# Patient Record
Sex: FEMALE | Race: WHITE | Employment: FULL TIME | URBAN - NONMETROPOLITAN AREA
[De-identification: names, ages, dates, MRNs, and addresses within clinical notes are randomized per-mention and may not be internally consistent; named-entity substitution may affect disease eponyms.]

---

## 2017-08-07 ENCOUNTER — OFFICE VISIT (OUTPATIENT)
Dept: ENT CLINIC | Age: 22
End: 2017-08-07
Payer: COMMERCIAL

## 2017-08-07 VITALS
SYSTOLIC BLOOD PRESSURE: 108 MMHG | HEIGHT: 67 IN | RESPIRATION RATE: 16 BRPM | HEART RATE: 78 BPM | DIASTOLIC BLOOD PRESSURE: 70 MMHG | TEMPERATURE: 98.3 F | WEIGHT: 159.5 LBS | BODY MASS INDEX: 25.03 KG/M2

## 2017-08-07 DIAGNOSIS — R49.0 HOARSENESS OF VOICE: Primary | ICD-10-CM

## 2017-08-07 PROCEDURE — 99202 OFFICE O/P NEW SF 15 MIN: CPT | Performed by: NURSE PRACTITIONER

## 2017-08-07 RX ORDER — OMEPRAZOLE 20 MG/1
20 CAPSULE, DELAYED RELEASE ORAL DAILY
Qty: 30 CAPSULE | Refills: 1 | Status: SHIPPED | OUTPATIENT
Start: 2017-08-07 | End: 2017-09-22 | Stop reason: SDUPTHER

## 2017-08-07 ASSESSMENT — ENCOUNTER SYMPTOMS
COLOR CHANGE: 0
TROUBLE SWALLOWING: 0
ABDOMINAL PAIN: 0
NAUSEA: 0
BLOOD IN STOOL: 0
ABDOMINAL DISTENTION: 0
RHINORRHEA: 0
APNEA: 0
SHORTNESS OF BREATH: 0
SINUS PRESSURE: 0
SORE THROAT: 0
EYE REDNESS: 0
CHOKING: 0
FACIAL SWELLING: 0
PHOTOPHOBIA: 0
CONSTIPATION: 0
EYE PAIN: 0
BACK PAIN: 0
EYE ITCHING: 0
ANAL BLEEDING: 0
DIARRHEA: 0
RECTAL PAIN: 0
STRIDOR: 0
VOMITING: 0
VOICE CHANGE: 1
EYE DISCHARGE: 0
CHEST TIGHTNESS: 0
COUGH: 0
WHEEZING: 0

## 2017-09-22 ENCOUNTER — OFFICE VISIT (OUTPATIENT)
Dept: ENT CLINIC | Age: 22
End: 2017-09-22
Payer: COMMERCIAL

## 2017-09-22 VITALS
WEIGHT: 150 LBS | BODY MASS INDEX: 24.11 KG/M2 | HEART RATE: 60 BPM | TEMPERATURE: 98.3 F | RESPIRATION RATE: 14 BRPM | DIASTOLIC BLOOD PRESSURE: 68 MMHG | SYSTOLIC BLOOD PRESSURE: 112 MMHG | HEIGHT: 66 IN

## 2017-09-22 DIAGNOSIS — R55 VASOVAGAL SYNCOPE: ICD-10-CM

## 2017-09-22 DIAGNOSIS — R49.0 HOARSENESS OF VOICE: Primary | ICD-10-CM

## 2017-09-22 DIAGNOSIS — K21.9 GASTROESOPHAGEAL REFLUX DISEASE WITHOUT ESOPHAGITIS: ICD-10-CM

## 2017-09-22 DIAGNOSIS — J38.2 VOCAL CORD NODULE: ICD-10-CM

## 2017-09-22 PROCEDURE — 31575 DIAGNOSTIC LARYNGOSCOPY: CPT | Performed by: OTOLARYNGOLOGY

## 2017-09-22 PROCEDURE — 99213 OFFICE O/P EST LOW 20 MIN: CPT | Performed by: OTOLARYNGOLOGY

## 2017-09-22 RX ORDER — GLYCOPYRROLATE 1 MG/1
1 TABLET ORAL DAILY PRN
Qty: 10 TABLET | Refills: 1 | Status: SHIPPED | OUTPATIENT
Start: 2017-09-22 | End: 2018-04-11 | Stop reason: ALTCHOICE

## 2017-09-22 RX ORDER — OMEPRAZOLE 20 MG/1
20 CAPSULE, DELAYED RELEASE ORAL DAILY
Qty: 30 CAPSULE | Refills: 1 | Status: SHIPPED | OUTPATIENT
Start: 2017-09-22 | End: 2017-12-14 | Stop reason: SDUPTHER

## 2017-09-22 ASSESSMENT — ENCOUNTER SYMPTOMS
SINUS PRESSURE: 0
TROUBLE SWALLOWING: 0
FACIAL SWELLING: 0
STRIDOR: 0
VOICE CHANGE: 0
APNEA: 0
NAUSEA: 0
VOMITING: 0
COLOR CHANGE: 0
ABDOMINAL PAIN: 0
SORE THROAT: 0
RHINORRHEA: 0
CHEST TIGHTNESS: 0
COUGH: 0
DIARRHEA: 0
CHOKING: 0
WHEEZING: 0
SHORTNESS OF BREATH: 0

## 2017-10-11 ENCOUNTER — HOSPITAL ENCOUNTER (OUTPATIENT)
Dept: SPEECH THERAPY | Age: 22
Setting detail: THERAPIES SERIES
Discharge: HOME | End: 2017-10-11
Payer: COMMERCIAL

## 2017-10-11 PROCEDURE — 92524 BEHAVRAL QUALIT ANALYS VOICE: CPT

## 2017-10-11 PROCEDURE — 92507 TX SP LANG VOICE COMM INDIV: CPT

## 2017-10-11 NOTE — PROGRESS NOTES
I certify that I have examined the patient below and determined that Physical Medicine and Rehabilitation service is necessary; that the secondary diagnosis for the provision of rehabilitation services is consistent with identified needs; that service will be furnished on an outpatient basis while the patient is in my care; that I approve the above plan of care for up to 90 days or as specifically noted above and will review it within that time frame or more often if the patients condition requires. Attestation, signature or co-signature of physician indicates approval of certification requirements.    ________________________ ____________ __________  Physician Signature   Date   Time    [address] EVALUATION      []Outpatient [address]  [][facility]  [][facility]  [] Rehab [] TCU    Date: 10/11/2017  Patient Name: Belem De La Cruz      CSN: [id number]   : 1995  (25 y.o.)  Gender: female   Referring Physician:  Dr. Tenzin Gonsales   Diagnosis: Hoarseness of voice   Secondary Diagnosis:  Dysphonia   Insurance/Certification Information: [employer]   Visit number / total approved visits:  1 - unlimited visits   Visit count since last progress note:  1  Certification Date:     History of Present Illness: Pt reports she 'loses her voice' on a weekly basis and has a more 'raspy' vocal quality, with onset occurring ~4-5 years ago, if not more. 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 Pt was referred to ENT by her PCP and saw Magalie Metcalf CNP on 17 and was prescribed medication for reflux. Pt returned for a follow-up appointment with Dr. Eloina Peterson on 17 and completed laryngoscopy with indications of small vocal nodule on right true vocal cord at junction of anterior and middle third.   ENT recommended patient continue with acid reflux and initiate voice therapy. Pt has a follow-up with Dr. Zac Mccullough on 11/21/17 during her Thanksgiving break from college (pt is a student in [place]). Please see medical history questionnaire for information related to prior medical history, allergies and medications    Subjective:  Pt pleasant and cooperative. No observation.     Pain: 0/10  Previous Tx: []Yes [x]No []Comment:  Medications: [x]Yes []No []List: Prilosec  Allergies: []Yes [x]No []List:  Asthma: []Yes [x]No []Comment:   GERD:  [x]Yes []No []Comment:     ORAL MOTOR EXAMINATION: [x]WFL []Impaired  []Comments:    BREATHING:   At Rest:  []Clavicular []Diaphragmatic/Abdominal [x]Thoracic  During Speech:  []Clavicular []Diaphragmatic/Abdominal [x]Thoracic    BREATH SUPPORT FOR SPEECH:  (Assessed during serial tasks - counting, reciting alphabet, days of the week, etc.)  []Adequate [x]Marginal []Inadequate  []Comments:    MAXIMUM PHONATION TIME:  5.9 Seconds with sustained /i/ production; 6.2 seconds with sustained /a/ production     VELOPHARYNGEAL CLOSURE/ADEQUACY:  [x]Adequate []Marginal []Inadequate  []Comments    VOICE PRODUCTION DURING CONNECTED SPEECH AND VOCAL TASKS:    NASAL RESONANCE: [x]Normal  []Nasal []Denasal      []Assimilation nasality  []Nasal emission      []Comments:    PITCH: []Normal  []Too high []Too low [x]Variable    []Diplophonia       Fundamental Frequency of Phonation:  Unable to be assessed due to inaccessibility to Visipitch system     Musical Fundamental Frequency range: Decreased perceptual pitch range with aphonia during attempts to produce higher frequencies           VOCAL INFLECTION: [x]Normal []Monotone []Excessive      []Comments:    ORAL RESONANCE:  [x]Normal []Cul-de-sac []Chesty       []Variable      []Comments:    INTENSITY / LOUDNESS: [x]Normal     VOCAL QUALITY:    Breathy []Mild [x]Moderate []Severe   Dysphonic []Mild []Moderate []Severe   Harsh []Mild []Moderate []Severe   Hoarse [x]Mild []Moderate []Severe   Strained/Strangled []Mild []Moderate []Severe   Strident []Mild []Moderate []Severe   Other []Mild  []Moderate []Severe     LARYNGEAL TENSION:   []Normal []Hypotension  [x]Hypertension: 4/10 in severity   []Comments:    SUSTAINED VOICED - VOICELESS PRODUCTION:  S/Z Ratio: 2.3 seconds, indicative of vocal fold pathology       Trial 1 Trial 2 Trial 3   /s/ 13.2 sec 18.4 sec 17.3 sec   /z/ 7.9 sec 7.9 sec 6.5 sec     RATE OF SPEECH: [x]Normal  []Abnormal     []Comments:    IDENTIFIED VOCAL ABUSES / MISUSES:  [x]Yelling - occasional   []Singing (abusive) [x]Coughing [x]Throat clearing   []Persistent URI factor  []Grunting  []Smoking [x]Caffeine: 1 cup of coffee a day   []Vocally demanding job []Coaching  []Talking over equipment    []Reduced hydration  []Smoky environments [x]Alcohol: 15 beers in a week  [x]Prolonged talking: socially  **Drinks 6-7 bottles of water a day    DYSARTHRIA: []Yes  [x]No     []Comments:    STIMULABILITY FOR IMPROVED VOICE: [x]Yes     CLINICAL IMPRESSIONS: The patient presents with mild-moderate dysphonia characterized by vocal quality that is moderately breathy and mildly hoarse with intermittently brief periods of complete aphonia. In addition, the patient presents with decreased pitch range, laryngeal hypertension, and perceptual pitch that is deemed too low at times in conversation. Pt feels she is at 60% of vocal baseline. Pt reports her dysphonia does affect her communication as she avoids social interaction when she 'loses her voice.' Pt reports her dysphonia worsens after talking for longer periods of time. The patient would benefit from speech therapy services to address dysphonia for improved vocal quality and thus improved communication in social settings. However, pt is unable to attend regular speech therapy services due to being a college student in [place].   Therefore, treatment/education was initiated this date with recommendations to continue with HEP independently. Recommend patient return for a 6 week recheck when she is home for Thanksgiving break. If her symptoms have not improved by that time, the patient may require a referral to a facility closer to [place] that she could receive more regular speech therapy. Pt is in agreement with this plan. RECOMMENDATIONS:   [x]Voice Therapy with 6 week recheck as patient is a student in [place] and is unable to attend therapy regularly. Specific Interventions for next treatment may include: monitor carryover use of vocal hygiene principles and GERD modifications; resonant voicing at the paragraph level; diaphragmatic breathing at the sentence level; relaxation exercises/stretches     EDUCATION:   Learner: [x]Patient [] Significant other [] Son/Daughter[] Parent     [] Other:   Education: [x] Reviewed results and recommendations of this evaluation     [] Reviewed diet and strategies     [] Reviewed signs, symptoms and risk of aspiration     [] Demonstrated how to thick liquid appropriately. [x] Reviewed goals and Plan of Care     [x] OTHER: Home exercise program    Method: [x] Discussion [x] Demonstration [x] Hand-out     [] OTHER:   Evaluation of Education:     [x] Verbalizes understanding [] Demonstrates with assistance     [] Demonstrates without assistance []Needs further instruction     [] No evidence of learning  [x] Family not present      SHORT TERM GOALS:   Short-term Goals (same as long term goals due to recheck only)   Timeframe for Short-term Goals: 6 weeks   Goal 1: The patient will demonstrate carryover use and/or independently identify vocal hygiene principles and GERD lifestyle modifications with no more than 2 throat clears within a 30 minute session for improved laryngeal health and thus improved vocal quality.     Goal 2: The patient will demonstrate appropriate use of resonant voicing 80% of the time when reading 5-7 sentence novel paragraph when provided min cues to decrease laryngeal hyperfunction and improve vocal quality. Goal 3: The patient will report laryngeal tension with severity no greater than 2/10 following completion of upper body relaxation stretches/exercises to decrease laryngeal hyperfunction and improve laryngeal health. Goal 4: The patient will demonstrate appropriate use of diaphragmatic breathing when reading 10+ word sentences in 8/10 trials when provided min cues for improved breath support within running speech. Goal 5: The patient will read the [place] with clear vocal quality 60% of the time when provided min cues to facilitate carryover of improved vocal quality into conversation. LONG TERM GOALS:   Long-term Goals  Timeframe for Long-term Goals: 6 weeks   Goal 1: The patient will demonstrate carryover use and/or independently identify vocal hygiene principles and GERD lifestyle modifications with no more than 2 throat clears within a 30 minute session for improved laryngeal health and thus improved vocal quality. Goal 2: The patient will demonstrate appropriate use of resonant voicing 80% of the time when reading 5-7 sentence novel paragraph when provided min cues to decrease laryngeal hyperfunction and improve vocal quality. Goal 3: The patient will report laryngeal tension with severity no greater than 2/10 following completion of upper body relaxation stretches/exercises to decrease laryngeal hyperfunction and improve laryngeal health. Goal 4: The patient will demonstrate appropriate use of diaphragmatic breathing when reading 10+ word sentences in 8/10 trials when provided min cues for improved breath support within running speech. Goal 5: The patient will read the [place] with clear vocal quality 60% of the time when provided min cues to facilitate carryover of improved vocal quality into conversation.        Treatment initiated this session (25 minutes):  Skilled education and demonstration provided on vocal hygiene principles and

## 2017-11-21 ENCOUNTER — OFFICE VISIT (OUTPATIENT)
Dept: ENT CLINIC | Age: 22
End: 2017-11-21
Payer: COMMERCIAL

## 2017-11-21 VITALS
BODY MASS INDEX: 25 KG/M2 | SYSTOLIC BLOOD PRESSURE: 118 MMHG | WEIGHT: 154.9 LBS | RESPIRATION RATE: 16 BRPM | DIASTOLIC BLOOD PRESSURE: 74 MMHG | HEART RATE: 56 BPM | TEMPERATURE: 98.2 F

## 2017-11-21 DIAGNOSIS — J38.2 VOCAL CORD NODULE: ICD-10-CM

## 2017-11-21 DIAGNOSIS — R49.0 HOARSENESS OF VOICE: Primary | ICD-10-CM

## 2017-11-21 DIAGNOSIS — K21.9 GASTROESOPHAGEAL REFLUX DISEASE WITHOUT ESOPHAGITIS: ICD-10-CM

## 2017-11-21 PROCEDURE — 99213 OFFICE O/P EST LOW 20 MIN: CPT | Performed by: OTOLARYNGOLOGY

## 2017-11-21 PROCEDURE — 31575 DIAGNOSTIC LARYNGOSCOPY: CPT | Performed by: OTOLARYNGOLOGY

## 2017-11-21 ASSESSMENT — ENCOUNTER SYMPTOMS
NAUSEA: 0
ABDOMINAL PAIN: 0
VOMITING: 0
COUGH: 0
COLOR CHANGE: 0
VOICE CHANGE: 0
CHOKING: 0
STRIDOR: 0
DIARRHEA: 0
WHEEZING: 0
SHORTNESS OF BREATH: 0
SORE THROAT: 0
RHINORRHEA: 0
TROUBLE SWALLOWING: 0
APNEA: 0
SINUS PRESSURE: 0
FACIAL SWELLING: 0
CHEST TIGHTNESS: 0

## 2017-11-21 NOTE — PROGRESS NOTES
change, appetite change, chills, diaphoresis, fatigue, fever and unexpected weight change. HENT: Negative for congestion, dental problem, ear discharge, ear pain, facial swelling, hearing loss, mouth sores, nosebleeds, postnasal drip, rhinorrhea, sinus pressure, sneezing, sore throat, tinnitus, trouble swallowing and voice change. Eyes: Negative for visual disturbance. Respiratory: Negative for apnea, cough, choking, chest tightness, shortness of breath, wheezing and stridor. Cardiovascular: Negative for chest pain, palpitations and leg swelling. Gastrointestinal: Negative for abdominal pain, diarrhea, nausea and vomiting. Endocrine: Negative for cold intolerance, heat intolerance, polydipsia and polyuria. Genitourinary: Negative for dysuria, enuresis and hematuria. Musculoskeletal: Negative for arthralgias, gait problem, neck pain and neck stiffness. Skin: Negative for color change and rash. Allergic/Immunologic: Negative for environmental allergies, food allergies and immunocompromised state. Neurological: Negative for dizziness, syncope, facial asymmetry, speech difficulty, light-headedness and headaches. Hematological: Negative for adenopathy. Does not bruise/bleed easily. Psychiatric/Behavioral: Negative for confusion and sleep disturbance. The patient is not nervous/anxious. Objective:     /74 (Site: Right Arm, Position: Sitting)   Pulse 56   Temp 98.2 °F (36.8 °C) (Oral)   Resp 16   Wt 154 lb 14.4 oz (70.3 kg)   BMI 25.00 kg/m²     Physical Exam   Constitutional: She is oriented to person, place, and time. She appears well-developed and well-nourished. She is cooperative. HENT:   Head: Normocephalic and atraumatic. Head is without laceration. Right Ear: Hearing, tympanic membrane, external ear and ear canal normal. No drainage or swelling. Tympanic membrane is not scarred, not perforated and not erythematous.  Tympanic membrane mobility is normal (on pneumatic of the arytenoid area bilaterally. . No pooling in the pyriform sinuses. She fainted during scope procedure, just lasting a few seconds. She has gotten lightheaded before and 1 or 2 episodes of fainting. Data:  All of the past medical history, past surgical history, family history, social history, allergies and current medications were reviewed with the patient. Assessment & Plan   Diagnoses and all orders for this visit:    1. Hoarseness of voice  AK LARYNGOSCOPY FLEXIBLE DIAGNOSTIC   2. Vocal cord nodule  AK LARYNGOSCOPY FLEXIBLE DIAGNOSTIC   3. Gastroesophageal reflux disease without esophagitis  AK LARYNGOSCOPY FLEXIBLE DIAGNOSTIC       The findings were explained and her questions were answered. Options were discussed including seeing a speech pathologist.  She currently sees one and I suggested continue speech therapy. Voice rest and continue with the GERD Regimen. Don't clear her throat. I will see her back in a few months. Return in about 3 months (around 2/21/2018) for Follow-Up TVC nodules. I, Kole Partida CMA ([facility]), am scribing for, and in the presence of Dr. Ale Goff. Electronically signed by Ronen Bl[facility]) on 11/21/17 at 5:09 PM.     (Please note that portions of this note were completed with a voice recognition program. Efforts were made to edit the dictations but occasionally words are mis-transcribed.)    I agree to the above documentation placed by my scribe. I have personally evaluated this patient. Additional findings are as noted. I reviewed the scribe's note and agree with the documented findings and plan of care. Any areas of disagreement are corrected. I agree with the chief complaint, past medical history, past surgical history, allergies, medications, social and family history as documented unless otherwise noted below.      Electronically signed by Minh Austin MD on 12/17/2017 at 5:18 PM

## 2017-11-22 ENCOUNTER — HOSPITAL ENCOUNTER (OUTPATIENT)
Dept: SPEECH THERAPY | Age: 22
Setting detail: THERAPIES SERIES
Discharge: HOME | End: 2017-11-22
Payer: COMMERCIAL

## 2017-11-22 PROCEDURE — 92507 TX SP LANG VOICE COMM INDIV: CPT

## 2017-11-22 NOTE — PROGRESS NOTES
returns to [place] for school, recommend patient pursue speech therapy services in [place] to allow for consistent attendance where she lives. SHORT TERM GOAL #1: The patient will demonstrate carryover use and/or independently identify vocal hygiene principles and GERD lifestyle modifications with no more than 2 throat clears within a 30 minute session for improved laryngeal health and thus improved vocal quality. GOAL NOT MET. CONTINUE GOAL. INTERVENTIONS: Patient reports she has not been diligent with hydration principles. Patient reports she drinks ~3 cups of coffee a week with continuation of consistent alcohol intake (~15 beers a week). Discussed alcohol having a dehydrating effect and the need for more water intake to compensate for the alcohol if she chooses not to decrease the amount of alcohol she drinks weekly. Discussed strategies to promote water intake and foods that can also maximize hydration. Pt reports she notices she 'loses her voice' on Sundays after a weekend of going out with friends. Pt admits she uses a louder vocal intensity when attending sporting events and going to loud environments. Discussed the importance of using conservative voice and implementing vocal rests throughout her week/weekend. Patient demonstrated understanding. Pt reports she has tried to be more mindful of throat clearing, but feels she does it without realizing it. Patient did not demonstrate any instances of throat clearing this session; however, pt reports feeling chronic globus sensation in pharynx. Recommended patient ask a friend or family member to assist with identifying instances of throat clearing to promote improved self-awareness and thus 'break the habit' of throat clearing. Pt demonstrated understanding.         SHORT TERM GOAL #2:The patient will demonstrate appropriate use of resonant voicing 80% of the time when reading 5-7 sentence novel paragraph when provided min cues to decrease laryngeal hyperfunction and improve vocal quality. GOAL NOT MET. CONTINUE GOAL. INTERVENTIONS:  Reviewed the rationale for resonant voicing and how to elicit. Patient was able to establish appropriate resonant pitch during sustained hum with good success when provided mod cues. Good carryover into the structured phrase level. Patient was then instructed to read 10+ word sentence with use of resonant voicing following a model. Pt completed with good success. ST then asked patient to read the same sentence with 'glottal vela' to improve patient's self-awareness of the difference between glottal vela and resonant voice. Asked patient to pay attention to the difference in how glottal vela felt and sounded versus resonant voice. Pt confirmed noticing a difference and admitted she thinks she talks in glottal vela most of the time. Provided patient with handouts listing 10+ word sentences and asked patient to read 10 of them a day with focus on resonant voice. Patient demonstrated understanding. SHORT TERM GOAL #3:The patient will report laryngeal tension with severity no greater than 2/10 following completion of upper body relaxation stretches/exercises to decrease laryngeal hyperfunction and improve laryngeal health. GOAL MET. NEW GOAL: Patient will complete vocal function exercises 3x each with no more than 2 total pitch breaks and with good pitch range when provided mod cues for improved vocal fold flexibility. INTERVENTIONS: Patient denies feeling any laryngeal tension this session. Pt reports she has not felt significant laryngeal tension recently. No instances of strained upper body noted this date. Education provided on the rationale for vocal function exercises and how to complete each. Patient able to demonstrate each vocal function exercise with good return following a model. Recommended patient complete 3-5 repetitions of each exercise, 2x a day.   Pt demonstrated understanding - provided mod cues for improved vocal fold flexibility. Goal 4: The patient will demonstrate appropriate use of diaphragmatic breathing when reading 10+ word sentences in 8/10 trials when provided min cues for improved breath support within running speech. GOAL NOT MET. CONTINUE GOAL. Goal 5: The patient will read the [place] with clear vocal quality 60% of the time when provided min cues to facilitate carryover of improved vocal quality into conversation. GOAL NOT MET. CONTINUE GOAL. Assessment: [x]Progressing towards goals []Not Progressing towards goals     The patient has met 1/5 goals within the last 6 weeks. This session served as a 6 week recheck following initial evaluation as patient was unable to attend consistent therapy for the past 1.5 months due to living in [place] for college. Patient does not feel her voice has improved since the time of evaluation and admits poor carryover use of vocal hygiene principles, resonant voicing, and diaphragmatic breathing that was discussed at first session. The patient continues to present with moderately breathy vocal quality with intermittent hoarseness and occasional pitch breaks at the conversational level. In addition, patient reports experiencing aphonia 1-2x a week, particularly after using her voice for longer periods of time or when talking louder in social situations. The patient does report laryngeal tension has improved but continues to feel chronic globus sensation in pharynx. ST and patient agree that patient would benefit from more consistent voice therapy to address dysphonia. Will plan to see patient 1-2x a week for the next 8 weeks depending on her availability with college schedule. If patient's voice does not improve by the time she returns to living in [place] full time, patient would benefit from a referral to [place] facility to address dysphonia.       Plan for Next Session: carryover use of vocal hygiene principles, monitor throat clearing, resonant voicing at the sentence level, diaphragmatic breathing at the sentence level, vocal function exercises     Patient Tolerance of Treatment:  [x]Tolerated well []Tolerated fair []Required rest breaks  []Fatigued    Education:  [x]Education was provided []Education not provided due to:  Learner:  [x]Patient []Significant Other [x]Other: mother   Education provided regarding:  [x]Goals and POC [x]Vocal hygiene principles    [x]Home Exercise Program []Progress and/or discharge information  Method of Education: [x]Discussion [x]Demonstration [x]Handout []Other:  Evaluation of Education:  [x]Verbalized understanding []Demonstrates without assistance  []Demonstrates with assistance [x]Needs further instruction    []No evidence of learning  []No family present      Plan: []Continue with current plan of care []Modify current plan of care   [x]Progress note - frequency and duration: 1-2x a week for 8 weeks  (patient's ability to attend therapy weekly will depend on college schedule)   []Discharge notes - reason for discharge:     [x]Patient continues to require treatment by a licensed therapist to address functional deficits as outlined in the established plan of care. Certification required: [x] Yes - See Physician Certification above.       [] No       Time in: 1100  Time out: 1145  Untimed treatment: 45  Timed treatment: 0  Total time: 45 minutes          Sixto Schmitt [address], [address]

## 2017-12-01 ENCOUNTER — HOSPITAL ENCOUNTER (OUTPATIENT)
Dept: SPEECH THERAPY | Age: 22
Setting detail: THERAPIES SERIES
Discharge: HOME | End: 2017-12-01
Payer: COMMERCIAL

## 2017-12-01 PROCEDURE — 92507 TX SP LANG VOICE COMM INDIV: CPT | Performed by: SPEECH-LANGUAGE PATHOLOGIST

## 2017-12-01 NOTE — PROGRESS NOTES
[address] THERAPY    [x] DAILY NOTE [] PROGRESS NOTE [] DISCHARGE NOTE    [x]Outpatient [address]   [][place] [address] [facility]   [][facility]      Date: 2017  Patient Name: Jai Castillo      CSN: [id number]   : 1995  (25 y.o.)  Gender: female   Referring Physician:  Dr. Adam Anderson   Diagnosis: Hoarseness of voice   Secondary Diagnosis:  Dysphonia   Insurance/Certification Information: Medical [place]   Visit number / total approved visits: 3 - unlimited visits   Visit count since last progress note:  3  Certification Date:   Last scheduled appointment: 17  Date of Last MBS:  N/A  Diet:  Regular diet with thin liquids     Subjective: Patient reports she has been completing her HEP 1-2 times per day. Patient reports she has had a cold this week. SHORT TERM GOAL #1: The patient will demonstrate carryover use and/or independently identify vocal hygiene principles and GERD lifestyle modifications with no more than 2 throat clears within a 30 minute session for improved laryngeal health and thus improved vocal quality. INTERVENTIONS: Patient reports she is drinking at least 64 ounces of water per day. Discussed trying to increase water intake to half her body weight in ounces if able. Patient continues to drink coffee and alcohol on the weekends. Reviewed the importance of increasing her water intake on days that she drinks caffeine and alcohol. Patient report she has had a cold this week so she has been clearing her throat and coughing some, but trying to limit it as much as possible. No spontaneous coughing or throat clearing noted during this session. Ongoing education on the effects of alcohol and caffeine on voice and patient verbalized understanding. Patient educated on easy onset and rationale for it. This was also demonstrated for the patient.    Patient demonstrated the \"safe\" way to cough with good success, but reports it is difficult to do it when she suddenly feels the urge to cough. SHORT TERM GOAL #2:The patient will demonstrate appropriate use of resonant voicing 80% of the time when reading 5-7 sentence novel paragraph when provided min cues to decrease laryngeal hyperfunction and improve vocal quality. INTERVENTIONS:  Brief review of resonant voicing prior to reading some sentences. Patient read the 10+ word sentences with good use of a resonant voice on 4/10 trials and min cues required on 6/10 trials. Patient's pitch and breath support dropped at the end of 6/10 of the sentences resulting in glottal vela. Patient with limited carryover of resonant voice into conversation. SHORT TERM GOAL #3: Patient will complete vocal function exercises 3x each with no more than 2 total pitch breaks and with good pitch range when provided mod cues for improved vocal fold flexibility. INTERVENTIONS:    Utilized Varthana this date to obtain the following information:  Sustained /i/:     - trial 1: 5.5 seconds, 1.6% RAP, no pitch breaks, no straining   - trial 2: 6.2 seconds, 1.3% RAP, no pitch breaks, min straining at the end d/t impaired breath support   - trial 3: 5.5 seconds, 2.2% RAP, no pitch breaks, but breathy, increased volume at the very beginning - discussed the importance of easy onset.      Gliding up the scale on \"knoll\":    - trial 1: 143 - 423 Hz range, no pitch breaks   - trial 2: 160 - 419 Hz range, no pitch breaks   - trial 3: 159 - 411 Hz range, no pitch breaks  **increased breathiness with higher pitches    Gliding down the scale on \"knoll\":   - trial 1:  387 - 159 Hz range, no pitch breaks   - trial 2:  409 - 175 Hz range, no pitch breaks   - trial 3:  407 - 188 Hz range, no pitch breaks      SHORT TERM GOAL #4: The patient will demonstrate appropriate use of diaphragmatic breathing when reading 10+ word sentences in 8/10 trials when provided min cues for improved breath support within running speech. INTERVENTIONS:  Min cues to take extra breaths while reading the 10+ word sentences, otherwise good use of abdominal/diaphragmatic breathing at the beginning of each sentence. SHORT TERM GOAL #5:The patient will read the [place] Passage with clear vocal quality 60% of the time when provided min cues to facilitate carryover of improved vocal quality into conversation. INTERVENTIONS: did not test this date d/t focus on other goals. LONG TERM GOALS:   Long-term Goals (same as short term goals)  Timeframe for Long-term Goals: 8 weeks   Goal 1: The patient will demonstrate carryover use and/or independently identify vocal hygiene principles and GERD lifestyle modifications with no more than 2 throat clears within a 30 minute session for improved laryngeal health and thus improved vocal quality. ONGOING   Goal 2: The patient will demonstrate appropriate use of resonant voicing 80% of the time when reading 5-7 sentence novel paragraph when provided min cues to decrease laryngeal hyperfunction and improve vocal quality. ONGOING    Goal 3: Patient will complete vocal function exercises 3x each with no more than 2 total pitch breaks and with good pitch range when provided mod cues for improved vocal fold flexibility. ONGOING  Goal 4: The patient will demonstrate appropriate use of diaphragmatic breathing when reading 10+ word sentences in 8/10 trials when provided min cues for improved breath support within running speech. ONGOING  Goal 5: The patient will read the [place] Passage with clear vocal quality 60% of the time when provided min cues to facilitate carryover of improved vocal quality into conversation.  ONGOING    Assessment: [x]Progressing towards goals []Not Progressing towards goals     Plan for Next Session: carryover use of vocal hygiene principles, monitor throat clearing, resonant voicing at the sentence level, diaphragmatic breathing at the sentence level,

## 2017-12-14 ENCOUNTER — TELEPHONE (OUTPATIENT)
Dept: ENT CLINIC | Age: 22
End: 2017-12-14

## 2017-12-14 DIAGNOSIS — R49.0 HOARSENESS OF VOICE: ICD-10-CM

## 2017-12-14 DIAGNOSIS — K21.9 GASTROESOPHAGEAL REFLUX DISEASE WITHOUT ESOPHAGITIS: ICD-10-CM

## 2017-12-14 RX ORDER — OMEPRAZOLE 20 MG/1
20 CAPSULE, DELAYED RELEASE ORAL DAILY
Qty: 30 CAPSULE | Refills: 2 | Status: SHIPPED | OUTPATIENT
Start: 2017-12-14 | End: 2018-03-13 | Stop reason: SDUPTHER

## 2017-12-18 ENCOUNTER — HOSPITAL ENCOUNTER (OUTPATIENT)
Dept: SPEECH THERAPY | Age: 22
Setting detail: THERAPIES SERIES
Discharge: HOME | End: 2017-12-18
Payer: COMMERCIAL

## 2017-12-18 PROCEDURE — 92507 TX SP LANG VOICE COMM INDIV: CPT

## 2017-12-18 NOTE — PROGRESS NOTES
[address] THERAPY    [x] DAILY NOTE [] PROGRESS NOTE [] DISCHARGE NOTE    []Outpatient [address]   [][place] [address]   [][facility]      Date: 2017  Patient Name: Richard Foote      CSN: [id number]   : 1995  (25 y.o.)  Gender: female   Referring Physician:  Dr. Aguirre Shows   Diagnosis: Hoarseness of voice   Secondary Diagnosis:  Dysphonia   Insurance/Certification Information: Medical [place]   Visit number / total approved visits: 4 - unlimited visits   Visit count since last progress note: 4  Certification Date: 12  Last scheduled appointment: 17  Date of Last MBS:  N/A  Diet:  Regular diet with thin liquids     Subjective: Pt reports her vocal quality is 'worse' today but states she did a lot of talking over the weekend because she graduated over the weekend. SHORT TERM GOAL #1: The patient will demonstrate carryover use and/or independently identify vocal hygiene principles and GERD lifestyle modifications with no more than 2 throat clears within a 30 minute session for improved laryngeal health and thus improved vocal quality. INTERVENTIONS: Patient reports she drank ~50 ounces of water a day within the past week. Pt is drinking 3 cups of coffee a week and ~37 alcoholic drinks a week, which is an overall decrease in alcoholic intake. Pt reports there was one week that she maintained adequate hydration and she noticed she did not 'lose her voice' that weekend. Pt reports she has noticed a decrease in overall frequency of throat clearing. Ongoing education provided on strategies to maximize hydration and suggested patient log her liquid intake every day. Pt demonstrated understanding.       SHORT TERM GOAL #2:The patient will demonstrate appropriate use of resonant voicing 80% of the time when reading 5-7 sentence novel paragraph when provided min cues to decrease laryngeal within running speech. INTERVENTIONS: Mod cues required for appropriate use of diaphragmatic breathing within both vocal function exercises and sentence reading due to patient's tendency to breathe from thorax region. SHORT TERM GOAL #5:The patient will read the [place] Passage with clear vocal quality 60% of the time when provided min cues to facilitate carryover of improved vocal quality into conversation. INTERVENTIONS: did not test this date d/t focus on other goals. LONG TERM GOALS:   Long-term Goals (same as short term goals)  Timeframe for Long-term Goals: 8 weeks   Goal 1: The patient will demonstrate carryover use and/or independently identify vocal hygiene principles and GERD lifestyle modifications with no more than 2 throat clears within a 30 minute session for improved laryngeal health and thus improved vocal quality. ONGOING   Goal 2: The patient will demonstrate appropriate use of resonant voicing 80% of the time when reading 5-7 sentence novel paragraph when provided min cues to decrease laryngeal hyperfunction and improve vocal quality. ONGOING    Goal 3: Patient will complete vocal function exercises 3x each with no more than 2 total pitch breaks and with good pitch range when provided mod cues for improved vocal fold flexibility. ONGOING  Goal 4: The patient will demonstrate appropriate use of diaphragmatic breathing when reading 10+ word sentences in 8/10 trials when provided min cues for improved breath support within running speech. ONGOING  Goal 5: The patient will read the [place] Passage with clear vocal quality 60% of the time when provided min cues to facilitate carryover of improved vocal quality into conversation.  ONGOING    Assessment: [x]Progressing towards goals []Not Progressing towards goals     Plan for Next Session: carryover use of vocal hygiene principles, resonant voicing at the sentence and conversational level, diaphragmatic breathing at the sentence level, vocal function exercises     Patient Tolerance of Treatment:  [x]Tolerated well []Tolerated fair []Required rest breaks  []Fatigued    Education:  [x]Education was provided []Education not provided due to:  Learner:  [x]Patient []Significant Other []Other: mother   Education provided regarding:  [x]Goals and POC [x]Vocal hygiene principles    [x]Home Exercise Program []Progress and/or discharge information  Method of Education: [x]Discussion [x]Demonstration []Handout []Other:  Evaluation of Education:  [x]Verbalized understanding []Demonstrates without assistance  [x]Demonstrates with assistance [x]Needs further instruction  []No evidence of learning  [x]No family present      Plan: [x]Continue with current plan of care []Modify current plan of care   []Progress note - frequency and duration: 1-2x a week for 8 weeks  (patient's ability to attend therapy weekly will depend on college schedule)   []Discharge notes - reason for discharge:     [x]Patient continues to require treatment by a licensed therapist to address functional deficits as outlined in the established plan of care. Certification required: [] Yes - See Physician Certification above.       [x] No       Time in: 1330  Time out: 1400  Untimed treatment: 30  Timed treatment: 0  Total time: 30 minutes          [facility] [address] [facility]

## 2017-12-20 ENCOUNTER — HOSPITAL ENCOUNTER (OUTPATIENT)
Dept: SPEECH THERAPY | Age: 22
Setting detail: THERAPIES SERIES
Discharge: HOME | End: 2017-12-20
Payer: COMMERCIAL

## 2017-12-20 PROCEDURE — 92507 TX SP LANG VOICE COMM INDIV: CPT

## 2017-12-20 NOTE — PROGRESS NOTES
[address] THERAPY    [x] DAILY NOTE [] PROGRESS NOTE [] DISCHARGE NOTE    []Outpatient [address]   [][place] [address]   [][facility]      Date: 2017  Patient Name: Josephine Dinh      CSN: [id number]   : 1995  (25 y.o.)  Gender: female   Referring Physician:  Dr. Francis Huff   Diagnosis: Hoarseness of voice   Secondary Diagnosis:  Dysphonia   Insurance/Certification Information: Medical [place]   Visit number / total approved visits: 5 - unlimited visits   Visit count since last progress note: 5  Certification Date:   Last scheduled appointment: 17  Date of Last MBS:  N/A  Diet:  Regular diet with thin liquids     Subjective: Pt reports she feels her voice continues to be breathy, but she has improved her compliance with hydration principles the last couple of days. Pt pleasant and cooperative. SHORT TERM GOAL #1: The patient will demonstrate carryover use and/or independently identify vocal hygiene principles and GERD lifestyle modifications with no more than 2 throat clears within a 30 minute session for improved laryngeal health and thus improved vocal quality. INTERVENTIONS: Patient reports she has been drinking at least 64 ounces of water a day for the past 2 days. Pt states she has been logging her liquid intake more consistently. Pt reports she has not been noticing throat clearing as much, except for in the mornings. No instances of throat clearing noted this session but x1 min cough was observed. Pt with spontaneous intake of water throughout session. Education provided on strategies to maintain hydration and conserve voice during the holiday season as patient reports she has several parties to attend this weekend. Pt demonstrated understanding.       SHORT TERM GOAL #2:The patient will demonstrate appropriate use of resonant voicing 80% of the time when reading 5-7 sentence novel paragraph when provided min cues to decrease laryngeal hyperfunction and improve vocal quality. INTERVENTIONS:   Reading 10+ word sentences: 8/10 sentences read with appropriate use of resonant voicing; cueing required in 2/10 sentences due to production of glottal vela, particularly at the end of sentences    2 minute conversational exchange:  Pt utilized resonant voicing 85% of the time and required cues to \"lilt pitch up\" slightly to return back to resonant voicing 15% of the time. SHORT TERM GOAL #3: Patient will complete vocal function exercises 3x each with no more than 2 total pitch breaks and with good pitch range when provided mod cues for improved vocal fold flexibility.     INTERVENTIONS:    Sustained /i/:     - trial 1: 6.8 seconds, no pitch breaks, no strain but breathy vocal quality    - trial 2: 6.6 seconds, no pitch breaks or strain but breathy vocal quality    - trial 3: 7 seconds, no pitch breaks or strain but with breathy vocal quality     Gliding up the scale on \"knoll\":    - trial 1: fair pitch range, no pitch breaks   - trial 2: fair pitch range, no pitch breaks   - trial 3: fair pitch range, no pitch breaks  **pt continues to present with increased breathiness with production of higher frequencies     Gliding down the scale on \"knoll\":   - trial 1:  Fair-good pitch range, no pitch breaks   - trial 2:  Fair-good pitch range, no pitch breaks   - trial 3:  Fair-good pitch range, no pitch breaks    Sustained production of various pitches within the musical scale on 'ol':  Musical note C:  No pitch breaks, min breathy vocal quality   Musical note D:  x1 min pitch break, increased breathy vocal quality   Musical note E:  x2 pitch breaks, significantly breathy vocal quality   Musical note F:  x2 pitch breaks, significantly breathy vocal quality   Musical note G: x1 pitch break, severely breathy vocal quality     **Educated patient to incorporate sustained production of various pitches ONGOING    Assessment: [x]Progressing towards goals []Not Progressing towards goals     Plan for Next Session: carryover use of vocal hygiene principles, resonant voicing at the sentence and conversational level, diaphragmatic breathing at the sentence level, vocal function exercises     Patient Tolerance of Treatment:  [x]Tolerated well []Tolerated fair []Required rest breaks  []Fatigued    Education:  [x]Education was provided []Education not provided due to:  Learner:  [x]Patient []Significant Other []Other: mother   Education provided regarding:  [x]Goals and POC [x]Vocal hygiene principles    [x]Home Exercise Program []Progress and/or discharge information  Method of Education: [x]Discussion [x]Demonstration []Handout []Other:  Evaluation of Education:  [x]Verbalized understanding []Demonstrates without assistance  []Demonstrates with assistance [x]Needs further instruction []No evidence of learning  [x]No family present      Plan: [x]Continue with current plan of care []Modify current plan of care   []Progress note - frequency and duration: 1-2x a week for 8 weeks  (patient's ability to attend therapy weekly will depend on college schedule)   []Discharge notes - reason for discharge:     [x]Patient continues to require treatment by a licensed therapist to address functional deficits as outlined in the established plan of care. Certification required: [] Yes - See Physician Certification above.       [x] No       Time in: 0900  Time out: 0927  Untimed treatment: 27  Timed treatment: 0  Total time: 27 minutes          Dustin Schmitt [address], [address]

## 2017-12-27 ENCOUNTER — HOSPITAL ENCOUNTER (OUTPATIENT)
Dept: SPEECH THERAPY | Age: 22
Setting detail: THERAPIES SERIES
Discharge: HOME | End: 2017-12-27
Payer: COMMERCIAL

## 2017-12-27 PROCEDURE — 92507 TX SP LANG VOICE COMM INDIV: CPT

## 2017-12-27 NOTE — PROGRESS NOTES
[address] THERAPY    [x] DAILY NOTE [] PROGRESS NOTE [] DISCHARGE NOTE    []Outpatient [address]   [][place] [address]   [][facility]      Date: 2017  Patient Name: Hedy Sherman      CSN: [id number]   : 1995  (25 y.o.)  Gender: female   Referring Physician:  Dr. Adan Cordova   Diagnosis: Hoarseness of voice   Secondary Diagnosis:  Dysphonia   Insurance/Certification Information: Medical [place]   Visit number / total approved visits: 6 - unlimited visits   Visit count since last progress note: 6  Certification Date:   Last scheduled appointment: 1/3/18  Date of Last MBS:  N/A  Diet:  Regular diet with thin liquids     Subjective: Patient pleasant and cooperative. Patient will be going on a short vacation starting tomorrow but will be available for therapy on 1/3/18. Patient will then return to college in [place] - therefore, probable discharge at next session. Discussed the benefits of continuing with voice therapy in [place] for continued progress. Pt demonstrated understanding. SHORT TERM GOAL #1: The patient will demonstrate carryover use and/or independently identify vocal hygiene principles and GERD lifestyle modifications with no more than 2 throat clears within a 30 minute session for improved laryngeal health and thus improved vocal quality. INTERVENTIONS:  Pt reports she feels she has been drinking 64 ounces of water a day even through the  holiday. Pt reports she was probably talking more with holiday gatherings. Pt states her voice would become 'low and raspy' the longer she talked but states she did not 'lose' her voice. This session, pt demonstrated x1 min throat clear but with spontaneous intake of water throughout the session.       SHORT TERM GOAL #2:The patient will demonstrate appropriate use of resonant voicing 80% of the time when reading 5-7 sentence novel paragraph when provided min cues to decrease laryngeal hyperfunction and improve vocal quality. INTERVENTIONS:   Reading 10+ word sentences: 8/10 sentences read with appropriate use of resonant voicing; min cueing required in 2/10 sentences due to production of glottal vela    Reading novel paragraph:  Pt utilized resonant voicing 83% of the time at an independent level     1.5 minute conversational exchange: Pt utilized resonant voicing 95% of the time at an independent level     SHORT TERM GOAL #3: Patient will complete vocal function exercises 3x each with no more than 2 total pitch breaks and with good pitch range when provided mod cues for improved vocal fold flexibility. INTERVENTIONS:  Patient reports she has not been completing HEP as consistently with the holiday season.   Completed the following this session:   Sustained /i/:     - trial 1: 5.3 seconds, no pitch breaks, no strain but breathy vocal quality    - trial 2: 5.2 seconds, no pitch breaks or strain but breathy vocal quality    - trial 3: 5.6 seconds, no pitch breaks or strain but breathy vocal quality     Gliding up the scale on \"knoll\":    - trial 1: fair pitch range, no pitch breaks   - trial 2: fair pitch range, no pitch breaks   - trial 3: fair pitch range, no pitch breaks    Gliding down the scale on \"knoll\":   - trial 1:  Fair-good pitch range, x1 min pitch breaks   - trial 2:  Fair-good pitch range, no pitch breaks, good vocal control    - trial 3:  Fair-good pitch range, no pitch breaks    Sustained production of various pitches within the musical scale on 'ol':  Musical note C:  No pitch breaks, min breathy vocal quality   Musical note D:  No pitch breaks, min breathy vocal quality   Musical note E:  x1 pitch break, but significantly breathy vocal quality   Musical note F:  x1 min pitch breaks but significantly breathy vocal quality   Musical note G: x1 pitch break, significantly breathy vocal quality     **Decreased phonation time noted with production of higher frequencies     SHORT TERM GOAL #4: The patient will demonstrate appropriate use of diaphragmatic breathing when reading 10+ word sentences in 8/10 trials when provided min cues for improved breath support within running speech. INTERVENTIONS:   Reading 10+ word sentences: 10/10 with appropriate use of diaphragmatic breathing when provided min cues     SHORT TERM GOAL #5:The patient will read the [place] Passage with clear vocal quality 60% of the time when provided min cues to facilitate carryover of improved vocal quality into conversation. INTERVENTIONS: did not test this date d/t focus on other goals. LONG TERM GOALS:   Long-term Goals (same as short term goals)  Timeframe for Long-term Goals: 8 weeks   Goal 1: The patient will demonstrate carryover use and/or independently identify vocal hygiene principles and GERD lifestyle modifications with no more than 2 throat clears within a 30 minute session for improved laryngeal health and thus improved vocal quality. ONGOING   Goal 2: The patient will demonstrate appropriate use of resonant voicing 80% of the time when reading 5-7 sentence novel paragraph when provided min cues to decrease laryngeal hyperfunction and improve vocal quality. ONGOING    Goal 3: Patient will complete vocal function exercises 3x each with no more than 2 total pitch breaks and with good pitch range when provided mod cues for improved vocal fold flexibility. ONGOING  Goal 4: The patient will demonstrate appropriate use of diaphragmatic breathing when reading 10+ word sentences in 8/10 trials when provided min cues for improved breath support within running speech. ONGOING  Goal 5: The patient will read the [place] Passage with clear vocal quality 60% of the time when provided min cues to facilitate carryover of improved vocal quality into conversation.  ONGOING    Assessment: [x]Progressing towards goals []Not Progressing towards goals     Plan for Next Session: carryover use of vocal hygiene principles, resonant voicing at the sentence, paragraph and conversational level, diaphragmatic breathing at the sentence level, vocal function exercises, reading of the [place] Passage, complete discharge note and education     Patient Tolerance of Treatment:  [x]Tolerated well []Tolerated fair []Required rest breaks  []Fatigued    Education:  [x]Education was provided []Education not provided due to:  Learner:  [x]Patient []Significant Other []Other: mother   Education provided regarding:  [x]Goals and POC [x]Vocal hygiene principles    [x]Home Exercise Program: novel paragraphs to practice resonant voicing  []Progress and/or discharge information  Method of Education: [x]Discussion [x]Demonstration [x]Handout []Other:  Evaluation of Education:  [x]Verbalized understanding []Demonstrates without assistance  []Demonstrates with assistance [x]Needs further instruction []No evidence of learning  [x]No family present      Plan: [x]Continue with current plan of care []Modify current plan of care   []Progress note - frequency and duration: 1-2x a week for 8 weeks  (patient's ability to attend therapy weekly will depend on college schedule)   []Discharge notes - reason for discharge:     [x]Patient continues to require treatment by a licensed therapist to address functional deficits as outlined in the established plan of care. Certification required: [] Yes - See Physician Certification above.       [x] No       Time in: 0900  Time out: 0930  Untimed treatment: 30  Timed treatment: 0  Total time: 30 minutes          [facility] [place], [address]

## 2018-01-03 ENCOUNTER — HOSPITAL ENCOUNTER (OUTPATIENT)
Dept: SPEECH THERAPY | Age: 23
Setting detail: THERAPIES SERIES
Discharge: HOME | End: 2018-01-03
Payer: COMMERCIAL

## 2018-01-03 PROCEDURE — 92507 TX SP LANG VOICE COMM INDIV: CPT

## 2018-01-03 NOTE — PROGRESS NOTES
with head of bed elevated as per GERD protocol. SHORT TERM GOAL #2:The patient will demonstrate appropriate use of resonant voicing 80% of the time when reading 5-7 sentence novel paragraph when provided min cues to decrease laryngeal hyperfunction and improve vocal quality. GOAL MET. INTERVENTIONS:   Reading 10+ word sentences: 8/10 sentences read with appropriate use of resonant voicing; min cueing required in 2/10 sentences due to production of glottal vela at the end of productions. Pt was able to identify instances of glottal vela when inquired. 1.5 minute conversational exchange:  Pt utilized resonant voicing ~70% of the time when provided min visual cues for improved resonant voicing and to re-inhale at appropriate junctions within running speech due to patient's tendency to say extended utterances on one breath, resulting in more glottal vela. SHORT TERM GOAL #3: Patient will complete vocal function exercises 3x each with no more than 2 total pitch breaks and with good pitch range when provided mod cues for improved vocal fold flexibility. GOAL NOT MET.    INTERVENTIONS: Completed the following this session:   Sustained /i/:     - trial 1: 5.3 seconds, no pitch breaks, no strain but breathy vocal quality    - trial 2: 6.4 seconds, no pitch breaks or strain but breathy vocal quality    - trial 3: 5.8 seconds, x1 pitch break,  no strain, breathy vocal quality     Gliding up the scale on \"knoll\":    - trial 1: fair pitch range, x1 min pitch break   - trial 2: fair pitch range, no pitch breaks   - trial 3: fair pitch range, no pitch breaks    Gliding down the scale on \"knoll\":   - trial 1:  Good pitch range, no pitch breaks    - trial 2:  Good pitch range, no pitch breaks, good vocal control    - trial 3:  Good pitch range, no pitch breaks    Sustained production of various pitches within the musical scale on 'ol':  Musical note C:  No pitch breaks, good vocal control but min breathy vocal quality

## 2018-03-13 ENCOUNTER — OFFICE VISIT (OUTPATIENT)
Dept: ENT CLINIC | Age: 23
End: 2018-03-13
Payer: COMMERCIAL

## 2018-03-13 VITALS
TEMPERATURE: 98.3 F | BODY MASS INDEX: 24.2 KG/M2 | SYSTOLIC BLOOD PRESSURE: 122 MMHG | DIASTOLIC BLOOD PRESSURE: 68 MMHG | HEART RATE: 76 BPM | WEIGHT: 150.6 LBS | RESPIRATION RATE: 16 BRPM | HEIGHT: 66 IN

## 2018-03-13 DIAGNOSIS — J38.2 VOCAL CORD NODULE: ICD-10-CM

## 2018-03-13 DIAGNOSIS — R55 VASOVAGAL SYNCOPE: ICD-10-CM

## 2018-03-13 DIAGNOSIS — K21.9 GASTROESOPHAGEAL REFLUX DISEASE WITHOUT ESOPHAGITIS: ICD-10-CM

## 2018-03-13 DIAGNOSIS — R49.0 HOARSENESS OF VOICE: Primary | ICD-10-CM

## 2018-03-13 PROCEDURE — 31575 DIAGNOSTIC LARYNGOSCOPY: CPT | Performed by: OTOLARYNGOLOGY

## 2018-03-13 PROCEDURE — G8484 FLU IMMUNIZE NO ADMIN: HCPCS | Performed by: OTOLARYNGOLOGY

## 2018-03-13 PROCEDURE — 99213 OFFICE O/P EST LOW 20 MIN: CPT | Performed by: OTOLARYNGOLOGY

## 2018-03-13 PROCEDURE — 1036F TOBACCO NON-USER: CPT | Performed by: OTOLARYNGOLOGY

## 2018-03-13 PROCEDURE — G8427 DOCREV CUR MEDS BY ELIG CLIN: HCPCS | Performed by: OTOLARYNGOLOGY

## 2018-03-13 PROCEDURE — G8420 CALC BMI NORM PARAMETERS: HCPCS | Performed by: OTOLARYNGOLOGY

## 2018-03-13 RX ORDER — OMEPRAZOLE 20 MG/1
20 CAPSULE, DELAYED RELEASE ORAL DAILY
Qty: 30 CAPSULE | Refills: 2 | Status: SHIPPED | OUTPATIENT
Start: 2018-03-13 | End: 2018-05-15 | Stop reason: SDUPTHER

## 2018-03-13 ASSESSMENT — ENCOUNTER SYMPTOMS
FACIAL SWELLING: 0
STRIDOR: 0
RHINORRHEA: 0
SINUS PRESSURE: 0
CHEST TIGHTNESS: 0
APNEA: 0
SORE THROAT: 0
NAUSEA: 0
VOICE CHANGE: 0
WHEEZING: 0
TROUBLE SWALLOWING: 0
COLOR CHANGE: 0
CHOKING: 0
DIARRHEA: 0
SHORTNESS OF BREATH: 0
COUGH: 0
ABDOMINAL PAIN: 0
VOMITING: 0

## 2018-03-13 NOTE — PROGRESS NOTES
Constitutional: Negative for activity change, appetite change, chills, diaphoresis, fatigue, fever and unexpected weight change. HENT: Negative for congestion, dental problem, ear discharge, ear pain, facial swelling, hearing loss, mouth sores, nosebleeds, postnasal drip, rhinorrhea, sinus pressure, sneezing, sore throat, tinnitus, trouble swallowing and voice change. Eyes: Negative for visual disturbance. Respiratory: Negative for apnea, cough, choking, chest tightness, shortness of breath, wheezing and stridor. Cardiovascular: Negative for chest pain, palpitations and leg swelling. Gastrointestinal: Negative for abdominal pain, diarrhea, nausea and vomiting. Endocrine: Negative for cold intolerance, heat intolerance, polydipsia and polyuria. Genitourinary: Negative for dysuria, enuresis and hematuria. Musculoskeletal: Negative for arthralgias, gait problem, neck pain and neck stiffness. Skin: Negative for color change and rash. Allergic/Immunologic: Negative for environmental allergies, food allergies and immunocompromised state. Neurological: Negative for dizziness, syncope, facial asymmetry, speech difficulty, light-headedness and headaches. Hematological: Negative for adenopathy. Does not bruise/bleed easily. Psychiatric/Behavioral: Negative for confusion and sleep disturbance. The patient is not nervous/anxious. Objective:     /68 (Site: Left Arm, Position: Sitting)   Pulse 76   Temp 98.3 °F (36.8 °C) (Oral)   Resp 16   Ht 5' 6\" (1.676 m)   Wt 150 lb 9.6 oz (68.3 kg)   BMI 24.31 kg/m²     Physical Exam   Constitutional: She is oriented to person, place, and time. She appears well-developed and well-nourished. She is cooperative. HENT:   Head: Normocephalic and atraumatic. Head is without laceration. Right Ear: Hearing, tympanic membrane, external ear and ear canal normal. No drainage or swelling.  Tympanic membrane is not scarred, not perforated and not

## 2018-03-23 ENCOUNTER — TELEPHONE (OUTPATIENT)
Dept: ENT CLINIC | Age: 23
End: 2018-03-23

## 2018-03-23 DIAGNOSIS — K21.9 GASTROESOPHAGEAL REFLUX DISEASE WITHOUT ESOPHAGITIS: Primary | ICD-10-CM

## 2018-03-23 DIAGNOSIS — J38.2 VOCAL CORD NODULE: ICD-10-CM

## 2018-03-23 DIAGNOSIS — R49.0 HOARSENESS: ICD-10-CM

## 2018-04-11 RX ORDER — ACETAMINOPHEN AND CODEINE PHOSPHATE 120; 12 MG/5ML; MG/5ML
1 SOLUTION ORAL DAILY
COMMUNITY

## 2018-04-11 RX ORDER — GLYCOPYRROLATE 1 MG/1
1 TABLET ORAL PRN
COMMUNITY

## 2018-04-16 ENCOUNTER — OFFICE VISIT (OUTPATIENT)
Dept: ENT CLINIC | Age: 23
End: 2018-04-16

## 2018-04-16 VITALS
TEMPERATURE: 98.6 F | HEART RATE: 76 BPM | BODY MASS INDEX: 23.7 KG/M2 | HEIGHT: 66 IN | RESPIRATION RATE: 16 BRPM | DIASTOLIC BLOOD PRESSURE: 66 MMHG | WEIGHT: 147.5 LBS | SYSTOLIC BLOOD PRESSURE: 118 MMHG

## 2018-04-16 DIAGNOSIS — J38.2 VOCAL CORD NODULE: ICD-10-CM

## 2018-04-16 DIAGNOSIS — R49.0 HOARSENESS OF VOICE: Primary | ICD-10-CM

## 2018-04-16 DIAGNOSIS — R55 VASOVAGAL EPISODE: ICD-10-CM

## 2018-04-16 PROCEDURE — PREOPEXAM PRE-OP EXAM: Performed by: NURSE PRACTITIONER

## 2018-04-16 ASSESSMENT — ENCOUNTER SYMPTOMS
SHORTNESS OF BREATH: 0
NAUSEA: 0
STRIDOR: 0
APNEA: 0
EYE PAIN: 0
WHEEZING: 0
COLOR CHANGE: 0
EYE REDNESS: 0
SINUS PRESSURE: 0
CONSTIPATION: 0
CHOKING: 0
EYE DISCHARGE: 0
ABDOMINAL DISTENTION: 0
FACIAL SWELLING: 0
VOMITING: 0
DIARRHEA: 0
TROUBLE SWALLOWING: 0
CHEST TIGHTNESS: 0
RHINORRHEA: 0
EYE ITCHING: 0
COUGH: 0
BACK PAIN: 0
PHOTOPHOBIA: 0
ABDOMINAL PAIN: 0
RECTAL PAIN: 0
ANAL BLEEDING: 0
SORE THROAT: 0
VOICE CHANGE: 0
BLOOD IN STOOL: 0

## 2018-04-17 PROBLEM — R49.0 HOARSENESS: Status: ACTIVE | Noted: 2018-04-17

## 2018-04-17 PROBLEM — J38.2 VOCAL CORD NODULE: Status: ACTIVE | Noted: 2018-04-17

## 2018-04-18 ENCOUNTER — ANESTHESIA (OUTPATIENT)
Dept: OPERATING ROOM | Age: 23
End: 2018-04-18
Payer: COMMERCIAL

## 2018-04-18 ENCOUNTER — HOSPITAL ENCOUNTER (OUTPATIENT)
Age: 23
Setting detail: OUTPATIENT SURGERY
Discharge: HOME | End: 2018-04-18
Attending: OTOLARYNGOLOGY | Admitting: OTOLARYNGOLOGY
Payer: COMMERCIAL

## 2018-04-18 ENCOUNTER — ANESTHESIA EVENT (OUTPATIENT)
Dept: OPERATING ROOM | Age: 23
End: 2018-04-18
Payer: COMMERCIAL

## 2018-04-18 VITALS
BODY MASS INDEX: 23.95 KG/M2 | OXYGEN SATURATION: 98 % | DIASTOLIC BLOOD PRESSURE: 64 MMHG | RESPIRATION RATE: 18 BRPM | SYSTOLIC BLOOD PRESSURE: 108 MMHG | HEIGHT: 66 IN | WEIGHT: 149 LBS | HEART RATE: 63 BPM | TEMPERATURE: 98.1 F

## 2018-04-18 VITALS
DIASTOLIC BLOOD PRESSURE: 74 MMHG | RESPIRATION RATE: 32 BRPM | SYSTOLIC BLOOD PRESSURE: 111 MMHG | OXYGEN SATURATION: 97 %

## 2018-04-18 DIAGNOSIS — J38.3 VOCAL CORD CYST: ICD-10-CM

## 2018-04-18 DIAGNOSIS — J38.2 VOCAL CORD NODULE: Primary | ICD-10-CM

## 2018-04-18 DIAGNOSIS — R49.0 HOARSENESS: ICD-10-CM

## 2018-04-18 LAB — PREGNANCY, URINE: NEGATIVE

## 2018-04-18 PROCEDURE — 6360000002 HC RX W HCPCS: Performed by: OTOLARYNGOLOGY

## 2018-04-18 PROCEDURE — 6370000000 HC RX 637 (ALT 250 FOR IP): Performed by: OTOLARYNGOLOGY

## 2018-04-18 PROCEDURE — 2500000003 HC RX 250 WO HCPCS: Performed by: NURSE ANESTHETIST, CERTIFIED REGISTERED

## 2018-04-18 PROCEDURE — 3700000000 HC ANESTHESIA ATTENDED CARE: Performed by: OTOLARYNGOLOGY

## 2018-04-18 PROCEDURE — 81025 URINE PREGNANCY TEST: CPT

## 2018-04-18 PROCEDURE — 6360000002 HC RX W HCPCS: Performed by: NURSE ANESTHETIST, CERTIFIED REGISTERED

## 2018-04-18 PROCEDURE — 3600000004 HC SURGERY LEVEL 4 BASE: Performed by: OTOLARYNGOLOGY

## 2018-04-18 PROCEDURE — 7100000010 HC PHASE II RECOVERY - FIRST 15 MIN: Performed by: OTOLARYNGOLOGY

## 2018-04-18 PROCEDURE — 7100000000 HC PACU RECOVERY - FIRST 15 MIN: Performed by: OTOLARYNGOLOGY

## 2018-04-18 PROCEDURE — 3600000014 HC SURGERY LEVEL 4 ADDTL 15MIN: Performed by: OTOLARYNGOLOGY

## 2018-04-18 PROCEDURE — 3700000001 HC ADD 15 MINUTES (ANESTHESIA): Performed by: OTOLARYNGOLOGY

## 2018-04-18 PROCEDURE — 7100000011 HC PHASE II RECOVERY - ADDTL 15 MIN: Performed by: OTOLARYNGOLOGY

## 2018-04-18 PROCEDURE — 7100000001 HC PACU RECOVERY - ADDTL 15 MIN: Performed by: OTOLARYNGOLOGY

## 2018-04-18 PROCEDURE — 31525 DX LARYNGOSCOPY EXCL NB: CPT | Performed by: OTOLARYNGOLOGY

## 2018-04-18 PROCEDURE — 2580000003 HC RX 258: Performed by: OTOLARYNGOLOGY

## 2018-04-18 RX ORDER — SODIUM CHLORIDE 9 MG/ML
INJECTION, SOLUTION INTRAVENOUS CONTINUOUS
Status: DISCONTINUED | OUTPATIENT
Start: 2018-04-18 | End: 2018-04-18 | Stop reason: HOSPADM

## 2018-04-18 RX ORDER — LIDOCAINE HYDROCHLORIDE 20 MG/ML
INJECTION, SOLUTION EPIDURAL; INFILTRATION; INTRACAUDAL; PERINEURAL PRN
Status: DISCONTINUED | OUTPATIENT
Start: 2018-04-18 | End: 2018-04-18 | Stop reason: SDUPTHER

## 2018-04-18 RX ORDER — LABETALOL HYDROCHLORIDE 5 MG/ML
5 INJECTION, SOLUTION INTRAVENOUS EVERY 10 MIN PRN
Status: DISCONTINUED | OUTPATIENT
Start: 2018-04-18 | End: 2018-04-18 | Stop reason: HOSPADM

## 2018-04-18 RX ORDER — ROCURONIUM BROMIDE 10 MG/ML
INJECTION, SOLUTION INTRAVENOUS PRN
Status: DISCONTINUED | OUTPATIENT
Start: 2018-04-18 | End: 2018-04-18 | Stop reason: SDUPTHER

## 2018-04-18 RX ORDER — GLYCOPYRROLATE 1 MG/5 ML
SYRINGE (ML) INTRAVENOUS PRN
Status: DISCONTINUED | OUTPATIENT
Start: 2018-04-18 | End: 2018-04-18 | Stop reason: SDUPTHER

## 2018-04-18 RX ORDER — DIPHENHYDRAMINE HYDROCHLORIDE 50 MG/ML
12.5 INJECTION INTRAMUSCULAR; INTRAVENOUS
Status: DISCONTINUED | OUTPATIENT
Start: 2018-04-18 | End: 2018-04-18 | Stop reason: HOSPADM

## 2018-04-18 RX ORDER — PROMETHAZINE HYDROCHLORIDE 25 MG/ML
12.5 INJECTION, SOLUTION INTRAMUSCULAR; INTRAVENOUS
Status: DISCONTINUED | OUTPATIENT
Start: 2018-04-18 | End: 2018-04-18 | Stop reason: HOSPADM

## 2018-04-18 RX ORDER — FENTANYL CITRATE 50 UG/ML
INJECTION, SOLUTION INTRAMUSCULAR; INTRAVENOUS PRN
Status: DISCONTINUED | OUTPATIENT
Start: 2018-04-18 | End: 2018-04-18 | Stop reason: SDUPTHER

## 2018-04-18 RX ORDER — NEOSTIGMINE METHYLSULFATE 5 MG/5 ML
SYRINGE (ML) INTRAVENOUS PRN
Status: DISCONTINUED | OUTPATIENT
Start: 2018-04-18 | End: 2018-04-18 | Stop reason: SDUPTHER

## 2018-04-18 RX ORDER — FENTANYL CITRATE 50 UG/ML
25 INJECTION, SOLUTION INTRAMUSCULAR; INTRAVENOUS EVERY 5 MIN PRN
Status: DISCONTINUED | OUTPATIENT
Start: 2018-04-18 | End: 2018-04-18 | Stop reason: HOSPADM

## 2018-04-18 RX ORDER — EPINEPHRINE 1 MG/ML
INJECTION, SOLUTION, CONCENTRATE INTRAVENOUS PRN
Status: DISCONTINUED | OUTPATIENT
Start: 2018-04-18 | End: 2018-04-18 | Stop reason: HOSPADM

## 2018-04-18 RX ORDER — HYDROCODONE BITARTRATE AND ACETAMINOPHEN 7.5; 325 MG/1; MG/1
1 TABLET ORAL EVERY 4 HOURS PRN
Qty: 20 TABLET | Refills: 0 | Status: SHIPPED | OUTPATIENT
Start: 2018-04-18 | End: 2018-04-23

## 2018-04-18 RX ORDER — METOCLOPRAMIDE HYDROCHLORIDE 5 MG/ML
10 INJECTION INTRAMUSCULAR; INTRAVENOUS
Status: DISCONTINUED | OUTPATIENT
Start: 2018-04-18 | End: 2018-04-18 | Stop reason: HOSPADM

## 2018-04-18 RX ORDER — MEPERIDINE HYDROCHLORIDE 25 MG/ML
12.5 INJECTION INTRAMUSCULAR; INTRAVENOUS; SUBCUTANEOUS EVERY 5 MIN PRN
Status: DISCONTINUED | OUTPATIENT
Start: 2018-04-18 | End: 2018-04-18 | Stop reason: HOSPADM

## 2018-04-18 RX ORDER — ONDANSETRON 2 MG/ML
INJECTION INTRAMUSCULAR; INTRAVENOUS PRN
Status: DISCONTINUED | OUTPATIENT
Start: 2018-04-18 | End: 2018-04-18 | Stop reason: SDUPTHER

## 2018-04-18 RX ORDER — HYDROCODONE BITARTRATE AND ACETAMINOPHEN 7.5; 325 MG/1; MG/1
1 TABLET ORAL EVERY 4 HOURS PRN
Status: DISCONTINUED | OUTPATIENT
Start: 2018-04-18 | End: 2018-04-18 | Stop reason: HOSPADM

## 2018-04-18 RX ORDER — PROPOFOL 10 MG/ML
INJECTION, EMULSION INTRAVENOUS PRN
Status: DISCONTINUED | OUTPATIENT
Start: 2018-04-18 | End: 2018-04-18 | Stop reason: SDUPTHER

## 2018-04-18 RX ORDER — FENTANYL CITRATE 50 UG/ML
50 INJECTION, SOLUTION INTRAMUSCULAR; INTRAVENOUS EVERY 5 MIN PRN
Status: DISCONTINUED | OUTPATIENT
Start: 2018-04-18 | End: 2018-04-18 | Stop reason: HOSPADM

## 2018-04-18 RX ORDER — MIDAZOLAM HYDROCHLORIDE 1 MG/ML
INJECTION INTRAMUSCULAR; INTRAVENOUS PRN
Status: DISCONTINUED | OUTPATIENT
Start: 2018-04-18 | End: 2018-04-18 | Stop reason: SDUPTHER

## 2018-04-18 RX ORDER — DEXAMETHASONE SODIUM PHOSPHATE 4 MG/ML
INJECTION, SOLUTION INTRA-ARTICULAR; INTRALESIONAL; INTRAMUSCULAR; INTRAVENOUS; SOFT TISSUE PRN
Status: DISCONTINUED | OUTPATIENT
Start: 2018-04-18 | End: 2018-04-18 | Stop reason: SDUPTHER

## 2018-04-18 RX ADMIN — DEXAMETHASONE SODIUM PHOSPHATE 8 MG: 4 INJECTION, SOLUTION INTRAMUSCULAR; INTRAVENOUS at 07:58

## 2018-04-18 RX ADMIN — LIDOCAINE HYDROCHLORIDE 50 MG: 20 INJECTION, SOLUTION EPIDURAL; INFILTRATION; INTRACAUDAL; PERINEURAL at 07:45

## 2018-04-18 RX ADMIN — Medication 10 MG: at 08:18

## 2018-04-18 RX ADMIN — SODIUM CHLORIDE: 9 INJECTION, SOLUTION INTRAVENOUS at 06:55

## 2018-04-18 RX ADMIN — ONDANSETRON 4 MG: 2 INJECTION INTRAMUSCULAR; INTRAVENOUS at 07:58

## 2018-04-18 RX ADMIN — MIDAZOLAM HYDROCHLORIDE 2 MG: 1 INJECTION, SOLUTION INTRAMUSCULAR; INTRAVENOUS at 07:39

## 2018-04-18 RX ADMIN — Medication 0.6 MG: at 08:57

## 2018-04-18 RX ADMIN — FENTANYL CITRATE 100 MCG: 50 INJECTION, SOLUTION INTRAMUSCULAR; INTRAVENOUS at 07:45

## 2018-04-18 RX ADMIN — HYDROCODONE BITARTRATE AND ACETAMINOPHEN 1 TABLET: 7.5; 325 TABLET ORAL at 10:20

## 2018-04-18 RX ADMIN — Medication 4 MG: at 08:57

## 2018-04-18 RX ADMIN — Medication 0.2 MG: at 07:45

## 2018-04-18 RX ADMIN — Medication 40 MG: at 07:45

## 2018-04-18 RX ADMIN — PROPOFOL 150 MG: 10 INJECTION, EMULSION INTRAVENOUS at 07:45

## 2018-04-18 ASSESSMENT — PULMONARY FUNCTION TESTS
PIF_VALUE: 20
PIF_VALUE: 20
PIF_VALUE: 0
PIF_VALUE: 20
PIF_VALUE: 1
PIF_VALUE: 20
PIF_VALUE: 2
PIF_VALUE: 20
PIF_VALUE: 20
PIF_VALUE: 23
PIF_VALUE: 20
PIF_VALUE: 19
PIF_VALUE: 20
PIF_VALUE: 20
PIF_VALUE: 23
PIF_VALUE: 20
PIF_VALUE: 18
PIF_VALUE: 20
PIF_VALUE: 17
PIF_VALUE: 24
PIF_VALUE: 20
PIF_VALUE: 3
PIF_VALUE: 23
PIF_VALUE: 20
PIF_VALUE: 23
PIF_VALUE: 20
PIF_VALUE: 9
PIF_VALUE: 4
PIF_VALUE: 18
PIF_VALUE: 1
PIF_VALUE: 20
PIF_VALUE: 17
PIF_VALUE: 2
PIF_VALUE: 20
PIF_VALUE: 18
PIF_VALUE: 20
PIF_VALUE: 20
PIF_VALUE: 23
PIF_VALUE: 20
PIF_VALUE: 0
PIF_VALUE: 20
PIF_VALUE: 21
PIF_VALUE: 20
PIF_VALUE: 28
PIF_VALUE: 20
PIF_VALUE: 23
PIF_VALUE: 20
PIF_VALUE: 29
PIF_VALUE: 1
PIF_VALUE: 20
PIF_VALUE: 17
PIF_VALUE: 26
PIF_VALUE: 1
PIF_VALUE: 1
PIF_VALUE: 20
PIF_VALUE: 23
PIF_VALUE: 20
PIF_VALUE: 21
PIF_VALUE: 1

## 2018-04-18 ASSESSMENT — PAIN SCALES - GENERAL
PAINLEVEL_OUTOF10: 5
PAINLEVEL_OUTOF10: 0
PAINLEVEL_OUTOF10: 5
PAINLEVEL_OUTOF10: 1
PAINLEVEL_OUTOF10: 3

## 2018-04-18 ASSESSMENT — PAIN DESCRIPTION - DESCRIPTORS: DESCRIPTORS: SORE

## 2018-04-18 ASSESSMENT — PAIN DESCRIPTION - PAIN TYPE: TYPE: SURGICAL PAIN

## 2018-04-18 ASSESSMENT — PAIN DESCRIPTION - LOCATION: LOCATION: THROAT

## 2018-05-15 ENCOUNTER — OFFICE VISIT (OUTPATIENT)
Dept: ENT CLINIC | Age: 23
End: 2018-05-15
Payer: COMMERCIAL

## 2018-05-15 VITALS
RESPIRATION RATE: 16 BRPM | HEIGHT: 66 IN | SYSTOLIC BLOOD PRESSURE: 122 MMHG | DIASTOLIC BLOOD PRESSURE: 62 MMHG | TEMPERATURE: 98 F | WEIGHT: 150.1 LBS | HEART RATE: 97 BPM | BODY MASS INDEX: 24.12 KG/M2

## 2018-05-15 DIAGNOSIS — R55 VASOVAGAL EPISODE: ICD-10-CM

## 2018-05-15 DIAGNOSIS — R49.0 HOARSENESS OF VOICE: Primary | ICD-10-CM

## 2018-05-15 DIAGNOSIS — K21.9 GASTROESOPHAGEAL REFLUX DISEASE WITHOUT ESOPHAGITIS: ICD-10-CM

## 2018-05-15 PROCEDURE — G8427 DOCREV CUR MEDS BY ELIG CLIN: HCPCS | Performed by: OTOLARYNGOLOGY

## 2018-05-15 PROCEDURE — 31575 DIAGNOSTIC LARYNGOSCOPY: CPT | Performed by: OTOLARYNGOLOGY

## 2018-05-15 PROCEDURE — 1036F TOBACCO NON-USER: CPT | Performed by: OTOLARYNGOLOGY

## 2018-05-15 PROCEDURE — G8420 CALC BMI NORM PARAMETERS: HCPCS | Performed by: OTOLARYNGOLOGY

## 2018-05-15 RX ORDER — OMEPRAZOLE 20 MG/1
20 CAPSULE, DELAYED RELEASE ORAL DAILY
Qty: 30 CAPSULE | Refills: 3 | Status: SHIPPED | OUTPATIENT
Start: 2018-05-15

## 2018-05-15 ASSESSMENT — ENCOUNTER SYMPTOMS
RHINORRHEA: 0
SINUS PRESSURE: 0
WHEEZING: 0
TROUBLE SWALLOWING: 0
VOMITING: 0
SHORTNESS OF BREATH: 0
STRIDOR: 0
ABDOMINAL PAIN: 0
APNEA: 0
NAUSEA: 0
COUGH: 0
VOICE CHANGE: 0
FACIAL SWELLING: 0
SORE THROAT: 0
DIARRHEA: 0
CHEST TIGHTNESS: 0
CHOKING: 0
COLOR CHANGE: 0

## 2018-06-04 ENCOUNTER — TELEPHONE (OUTPATIENT)
Dept: ENT CLINIC | Age: 23
End: 2018-06-04

## 2019-05-24 ENCOUNTER — HOSPITAL ENCOUNTER (OUTPATIENT)
Dept: MRI IMAGING | Age: 24
Discharge: HOME | End: 2019-05-24
Payer: COMMERCIAL

## 2019-05-24 DIAGNOSIS — G43.809 OTHER MIGRAINE WITHOUT STATUS MIGRAINOSUS, NOT INTRACTABLE: ICD-10-CM

## 2019-05-24 PROCEDURE — 70551 MRI BRAIN STEM W/O DYE: CPT

## (undated) DEVICE — COVER ARMBRD W13XL28.5IN IMPERV BLU FOR OP RM

## (undated) DEVICE — 4-PORT MANIFOLD: Brand: NEPTUNE 2

## (undated) DEVICE — SOLUTION IV IRRIG POUR BRL 0.9% SODIUM CHL 2F7124

## (undated) DEVICE — SUREFIT, DUAL DISPERSIVE ELECTRODE, CONTACT QUALITY MONITOR: Brand: SUREFIT

## (undated) DEVICE — TUBING, SUCTION, 1/4" X 20', STRAIGHT: Brand: MEDLINE INDUSTRIES, INC.

## (undated) DEVICE — PAD,NON-ADHERENT,3X8,STERILE,LF,1/PK: Brand: MEDLINE

## (undated) DEVICE — GAUZE,SPONGE,8"X4",12PLY,XRAY,STRL,LF: Brand: MEDLINE

## (undated) DEVICE — ANTI-FOG SOLUTION WITH FOAM PAD: Brand: DEVON

## (undated) DEVICE — CODMAN® SURGICAL PATTIES 1/2" X 1/2" (1.27CM X 1.27CM): Brand: CODMAN®

## (undated) DEVICE — YANKAUER,BULB TIP,W/O VENT,RIGID,STERILE: Brand: MEDLINE

## (undated) DEVICE — STRIP SKIN CLSR W0.25XL4IN WHT SPUNBOUND FBR NYL HI ADH

## (undated) DEVICE — CANISTER, RIGID, 2000CC: Brand: MEDLINE INDUSTRIES, INC.